# Patient Record
Sex: FEMALE | Race: WHITE
[De-identification: names, ages, dates, MRNs, and addresses within clinical notes are randomized per-mention and may not be internally consistent; named-entity substitution may affect disease eponyms.]

---

## 2018-05-03 ENCOUNTER — HOSPITAL ENCOUNTER (OUTPATIENT)
Dept: HOSPITAL 89 - ZZSENDIN | Age: 43
End: 2018-05-03
Attending: PHYSICIAN ASSISTANT
Payer: COMMERCIAL

## 2018-05-03 DIAGNOSIS — D22.5: Primary | ICD-10-CM

## 2018-05-03 PROCEDURE — 88305 TISSUE EXAM BY PATHOLOGIST: CPT

## 2018-11-09 ENCOUNTER — HOSPITAL ENCOUNTER (OUTPATIENT)
Dept: HOSPITAL 89 - MAMO | Age: 43
End: 2018-11-09
Attending: OBSTETRICS & GYNECOLOGY
Payer: COMMERCIAL

## 2018-11-09 DIAGNOSIS — Z12.31: Primary | ICD-10-CM

## 2018-11-09 PROCEDURE — 77063 BREAST TOMOSYNTHESIS BI: CPT

## 2018-11-09 PROCEDURE — 77067 SCR MAMMO BI INCL CAD: CPT

## 2018-11-12 NOTE — RADIOLOGY IMAGING REPORT
[place] [facility] [place] 

 

PATIENT NAME: KASHMIR PARADA

: [id number]

MR: [id number]

V: [id number]

EXAM DATE: [id number]

ORDERING PHYSICIAN: JOSÉ BRICENO

TECHNOLOGIST: Maria M Gusman

 

PROCEDURE:BILATERAL DIGITAL SCREENING MAMMOGRAM WITH CAD ASSISTED 

INTERPRETATION & 3D TOMOSYNTHESIS 

 

COMPARISON:Prior mammograms from 2017 through 2013.

 

INDICATIONS:screening

 

FINDINGS:  

Scattered fibroglandular tissue in the central outer aspects of both 

breast with no mass lesions, architectural distortions, or any clusters 

of suspicious microcalcifications.

 

DIAGNOSTIC CATEGORY 1--NEGATIVE.  

 

RECOMMENDATIONS:

ROUTINE MAMMOGRAM AND CLINICAL EVALUATION.   

 

IMPRESSION: 

BIRADS 1: Negative.

 

 

 

 

 

 

 

 

 

Dictated by:  Bo Horvath M.D. on 2018 at 15:14   

Transcribed by: FIX on 2018 at 8:33    

Approved by:  Venkata Urrutia on 2018 at 14:53   

[facility]

## 2018-11-29 ENCOUNTER — HOSPITAL ENCOUNTER (OUTPATIENT)
Dept: HOSPITAL 89 - LAB | Age: 43
End: 2018-11-29
Attending: OBSTETRICS & GYNECOLOGY
Payer: COMMERCIAL

## 2018-11-29 DIAGNOSIS — N89.8: Primary | ICD-10-CM

## 2018-11-29 PROCEDURE — 87070 CULTURE OTHR SPECIMN AEROBIC: CPT

## 2019-04-11 ENCOUNTER — HOSPITAL ENCOUNTER (OUTPATIENT)
Dept: HOSPITAL 89 - ZZSENDIN | Age: 44
End: 2019-04-11
Attending: PHYSICIAN ASSISTANT
Payer: COMMERCIAL

## 2019-04-11 DIAGNOSIS — D22.5: Primary | ICD-10-CM

## 2019-04-11 PROCEDURE — 88305 TISSUE EXAM BY PATHOLOGIST: CPT

## 2019-06-11 VITALS — DIASTOLIC BLOOD PRESSURE: 38 MMHG | SYSTOLIC BLOOD PRESSURE: 83 MMHG

## 2019-06-12 ENCOUNTER — HOSPITAL ENCOUNTER (OUTPATIENT)
Dept: HOSPITAL 89 - OR | Age: 44
Discharge: HOME | End: 2019-06-12
Attending: SURGERY
Payer: COMMERCIAL

## 2019-06-12 VITALS — SYSTOLIC BLOOD PRESSURE: 113 MMHG | DIASTOLIC BLOOD PRESSURE: 79 MMHG

## 2019-06-12 VITALS — DIASTOLIC BLOOD PRESSURE: 85 MMHG | SYSTOLIC BLOOD PRESSURE: 116 MMHG

## 2019-06-12 VITALS — DIASTOLIC BLOOD PRESSURE: 84 MMHG | SYSTOLIC BLOOD PRESSURE: 117 MMHG

## 2019-06-12 VITALS — HEIGHT: 64 IN | BODY MASS INDEX: 34.83 KG/M2 | WEIGHT: 204 LBS

## 2019-06-12 VITALS — DIASTOLIC BLOOD PRESSURE: 38 MMHG | SYSTOLIC BLOOD PRESSURE: 83 MMHG

## 2019-06-12 VITALS — DIASTOLIC BLOOD PRESSURE: 81 MMHG | SYSTOLIC BLOOD PRESSURE: 114 MMHG

## 2019-06-12 DIAGNOSIS — Z12.11: Primary | ICD-10-CM

## 2019-06-12 DIAGNOSIS — Z83.71: ICD-10-CM

## 2019-06-12 DIAGNOSIS — K52.9: ICD-10-CM

## 2019-06-12 DIAGNOSIS — K62.89: ICD-10-CM

## 2019-06-12 DIAGNOSIS — Z86.010: ICD-10-CM

## 2019-06-12 PROCEDURE — 00811 ANES LWR INTST NDSC NOS: CPT

## 2019-06-12 PROCEDURE — 45380 COLONOSCOPY AND BIOPSY: CPT

## 2019-06-12 PROCEDURE — 88305 TISSUE EXAM BY PATHOLOGIST: CPT

## 2019-06-12 PROCEDURE — 81025 URINE PREGNANCY TEST: CPT

## 2019-06-12 RX ADMIN — SODIUM CHLORIDE, SODIUM ACETATE ANHYDROUS, SODIUM GLUCONATE, POTASSIUM CHLORIDE, AND MAGNESIUM CHLORIDE PRN MLS/HR: 526; 222; 502; 37; 30 INJECTION, SOLUTION INTRAVENOUS at 05:57

## 2019-06-12 RX ADMIN — SODIUM CHLORIDE, SODIUM ACETATE ANHYDROUS, SODIUM GLUCONATE, POTASSIUM CHLORIDE, AND MAGNESIUM CHLORIDE PRN MLS/HR: 526; 222; 502; 37; 30 INJECTION, SOLUTION INTRAVENOUS at 08:01

## 2019-06-12 NOTE — SHORT(OUTPT) DISCHARGE SUMMARY
Discharge Summary


Reason for Hosp/Final Diag:  


(1) Family history of colonic polyps


Hospital Course & Plan:  Colonoscopy with biopsies completed without problems.  


No polyps.  Mild rectal inflammation and hemorrhoids.





(2) Personal history of colonic polyps


Status:  Chronic


Departure


Discharge to:  Home, Self Care





Discharge Instructions


Home Meds


Reported Medications


Ibuprofen (ADVIL) Unknown Strength Tablet, 400 MG PO PRN


   3/19/19


Fexofenadine/Pseudoephedrine (ALLEGRA-D 12 HOUR TABLET) 1 Each Tab.er.12h, 1 TAB


PO PRN


   3/19/19


Calcium Carb & Cit/Vitamin D3 (CALCIUM + D3 ER TABLET) 1 Each Tablet.er, 1 EACH 


PO


   3/19/19


Multivitamin (MULTI VITAMIN DAILY) 1 Each Tablet, 1 EACH PO


   11/29/18


L.acidoph & Paracasei,B.lactis (Probiotic) Unknown Strength Capsule, PO QDAY


   11/29/18


Diet:  Regular


Activity:  As Tolerated


Special Instructions:  


Your colonoscopy was completed without problems.  I didn't find any polyps


today.  You have mild inflammation in your rectum and I biopsied this.  My


office will call you in the next week or two to let you know what the


biopsies reveal.  Your next colonoscopy should be in 5 years.











ULLRICH,JOHN A MD              Jun 12, 2019 08:06